# Patient Record
Sex: MALE | Race: WHITE | NOT HISPANIC OR LATINO | URBAN - METROPOLITAN AREA
[De-identification: names, ages, dates, MRNs, and addresses within clinical notes are randomized per-mention and may not be internally consistent; named-entity substitution may affect disease eponyms.]

---

## 2018-08-07 ENCOUNTER — CONVERSION ENCOUNTER (OUTPATIENT)
Dept: GASTROENTEROLOGY | Facility: CLINIC | Age: 58
End: 2018-08-07

## 2018-08-07 ENCOUNTER — OFFICE VISIT CONVERTED (OUTPATIENT)
Dept: GASTROENTEROLOGY | Facility: CLINIC | Age: 58
End: 2018-08-07
Attending: INTERNAL MEDICINE

## 2018-10-09 ENCOUNTER — CONVERSION ENCOUNTER (OUTPATIENT)
Dept: GASTROENTEROLOGY | Facility: CLINIC | Age: 58
End: 2018-10-09

## 2018-10-09 ENCOUNTER — OFFICE VISIT CONVERTED (OUTPATIENT)
Dept: GASTROENTEROLOGY | Facility: CLINIC | Age: 58
End: 2018-10-09
Attending: INTERNAL MEDICINE

## 2019-03-07 ENCOUNTER — OFFICE VISIT CONVERTED (OUTPATIENT)
Dept: FAMILY MEDICINE CLINIC | Facility: CLINIC | Age: 59
End: 2019-03-07
Attending: FAMILY MEDICINE

## 2020-07-13 ENCOUNTER — OFFICE VISIT CONVERTED (OUTPATIENT)
Dept: FAMILY MEDICINE CLINIC | Facility: CLINIC | Age: 60
End: 2020-07-13
Attending: NURSE PRACTITIONER

## 2020-07-13 ENCOUNTER — CONVERSION ENCOUNTER (OUTPATIENT)
Dept: FAMILY MEDICINE CLINIC | Facility: CLINIC | Age: 60
End: 2020-07-13

## 2020-07-28 ENCOUNTER — OFFICE VISIT CONVERTED (OUTPATIENT)
Dept: FAMILY MEDICINE CLINIC | Facility: CLINIC | Age: 60
End: 2020-07-28
Attending: NURSE PRACTITIONER

## 2021-05-13 NOTE — PROGRESS NOTES
Progress Note      Patient Name: Lui Sanchez   Patient ID: [id number]   Sex: Male   YOB: 1960    Primary Care Provider: Linda KAUR   Referring Provider: Linda KAUR    Visit Date: July 13, 2020    Provider: NATASHA Mace   Location: [facility]   Location Address: [address] [place] [address], [address]  [id number]   Location Phone: [phone]          Chief Complaint  · allergic reaction to hair dye      History Of Present Illness  Lui Sanchez is a 59 year old /Black male who presents for evaluation and treatment of:      For an acute visit today.  He is a patient of Dr. Green.  He is complaining of allergic reaction to the hair dye that he used on his hair yesterday.  He states that the hair dye dripped down to his chin and his ear and now he has got swelling and redness in this face and ears and is itching.  He denies any difficulty breathing.       Past Medical History  Disease Name Date Onset Notes   Allergic rhinitis, chronic --  --    Colon cancer screening --  3/24/17- Colonoscopy- Hyperplastic polyp- DR. Beltre   GERD --  --    High blood pressure --  --    Peripheral neuropathy --  --    Radiculopathy --  --          Past Surgical History  Procedure Name Date Notes   Appendectomy --  --    Hernia --  --    Hernia Repair --  --    Shoulder surgery --  --          Medication List  Name Date Started Instructions   amlodipine-benazepril 10-40 mg oral capsule  take 1 capsule by oral route once daily   Keflex 500 mg oral capsule 07/28/2020 take 1 capsule (500 mg) by oral route every 12 hours for 7 days   Protonix 40 mg oral tablet,delayed release (/EC) 01/08/2019 take 1 tablet (40 mg) by oral route once daily for 30 days         Allergy List  Allergen Name Date Reaction Notes   NO KNOWN DRUG ALLERGIES --  --  --    Dye allergic reaction --  --  Hair Dye         Family Medical History  Disease Name Relative/Age Notes  "  Cancer, Unspecified  --    Diabetes, unspecified type Brother/  Father/  Grandmother (maternal)/  Mother/   Mother; Father; Grandmother (maternal); Aunt (maternal); Aunt (paternal); Uncle (paternal); Brother   Family history of breast cancer Grandmother (paternal)/60s   Grandmother (paternal)/60s   -Father's Family History Unknown Father/   Father   -Mother's Family History Unknown Mother/   Mother         Social History  Finding Status Start/Stop Quantity Notes   Alcohol Current some day --/-- --  drinks weekly   Caffeine Current - status unknown --/-- --  drinks coffee; 3-4 times per day   Second hand smoke exposure Never --/-- --  no   Tobacco Former [age] --  former smoker; started smoking at age 21; quit smoking at age 41; smoked 20 cigarette(s) per day         Review of Systems  · Constitutional  o Denies  o : fever, fatigue, weight loss, weight gain  · HENT  o Denies  o : nasal congestion, dental problems, sore throat, ear pain  · Cardiovascular  o Denies  o : lower extremity edema, claudication, chest pressure, palpitations  · Respiratory  o Denies  o : shortness of breath, wheezing, cough, hemoptysis, dyspnea on exertion  · Gastrointestinal  o Denies  o : nausea, vomiting, diarrhea, constipation, abdominal pain  · Integument  o Admits  o : Swelling and redness of face and ears, itching      Vitals  Date Time BP Position Site L\R Cuff Size HR RR TEMP (F) WT  HT  BMI kg/m2 BSA m2 O2 Sat        07/13/2020 04:00 /82 Sitting    66 - R  98.2 233lbs 4oz 5'  10\" 33.47 2.29 98 %          Physical Examination  · Constitutional  o Appearance  o : no acute distress, well-nourished  · Head and Face  o Head  o :   § Inspection  § : atraumatic, normocephalic  · Respiratory  o Respiratory Effort  o : breathing comfortably, symmetric chest rise  o Auscultation of Lungs  o : clear to asculatation bilaterally, no wheezes, rales, or rhonchii  · Cardiovascular  o Heart  o :   § Auscultation of Heart  § : regular rate " and rhythm, no murmurs, rubs, or gallops  o Peripheral Vascular System  o :   § Extremities  § : no edema  · Lymphatic  o Neck  o : no lymphadenopathy present  · Skin and Subcutaneous Tissue  o General Inspection  o : Bilateral ears and cheeks of face are red and swollen  · Neurologic  o Mental Status Examination  o :   § Orientation  § : grossly oriented to person, place and time  o Gait and Station  o :   § Gait Screening  § : normal gait  · Psychiatric  o General  o : normal mood and affect          Assessment  · Allergic reaction to chemical substance       Toxic effect of unspecified substance, accidental (unintentional), initial encounter     989.9/T65.91XA    Problems Reconciled  Plan  · Orders  o IM/SQ - Injection Fee [facility] (61961) - - 07/13/2020  o ACO-39: Current medications updated and reviewed () - - 07/13/2020  o Solu-Medrol Injection 125mg () - 989.9/T65.91XA - 07/13/2020   Injection - Solu-Medrol 125 mg; Dose: 125 mg; Site: Right Gluteus; Route: intramuscular; Date: 07/13/2020 16:33:15; Exp: 02/01/2022; Lot: [id number]; Mfg: [employer]; TradeName: Solu-Medrol; Location: [facility]; Administered By: Elisabet Mcknight MA; Comment: Pt tolerated well, stable condition  · Medications  o prednisone 10 mg oral tablets,dose pack   SIG: take as directed for 6 days   DISP: (1) 21 ct dose-pack with 0 refills  Prescribed on 07/13/2020     o Benadryl 25 mg oral capsule   SIG: take 1 capsule (25 mg) by oral route every 4-6 hours as needed for 4 days   DISP: (20) capsules with 0 refills  Prescribed on 07/13/2020     o Medications have been Reconciled  o Transition of Care or Provider Policy  · Instructions  o Take all medications as prescribed/directed.  o Patient was educated/instructed on their diagnosis, treatment and medications prior to discharge from the clinic today.  o Patient instructed to seek medical attention urgently for new or worsening symptoms.  o Call the office with any concerns or  questions.  · Disposition  o Call or Return if symptoms worsen or persist.     Solu-Medrol 125 mg IM in office today.  Will start prednisone Dosepak in a.m.  Advised to start taking Benadryl tonight and every 4-6 hours as needed, advised will cause drowsiness and not to drive while taking.  Advised to always test hair dye on small area first.    Instructed that if his swelling or if he starts having any difficulty breathing to go to the ER immediately.             Electronically Signed by: NATASHA Mace -Author on July 28, 2020 03:34:59 PM

## 2021-05-13 NOTE — PROGRESS NOTES
Progress Note      Patient Name: Lui Sanchez   Patient ID: [id number]   Sex: Male   YOB: 1960    Primary Care Provider: Destin Deng MD   Referring Provider: Destin Deng MD    Visit Date: July 28, 2020    Provider: NATASHA Mace   Location: [facility]   Location Address: [address] [place] [address], [address]  [id number]   Location Phone: [phone]          Chief Complaint  · rash on back of head, ears, and scalp still burning, possibly still infected      History Of Present Illness  Lui Sanchez is a 59 year old /Black male who presents for evaluation and treatment of:      For an acute visit today and a follow-up on allergic reaction to hair dye.  He is a patient of Dr. Green.    He was treated on 7/13/2020 for an acute reaction to hair dye.  He has had swelling and redness around his face and ears.  He was treated with Solu-Medrol injection, prednisone Dosepak, and Benadryl.  He states the swelling improved the next day.  However, now his external ears are dry, cracked and erythematous.  He states his external ears are oozing fluid.   He has been applying Vaseline.       Past Medical History  Disease Name Date Onset Notes   Allergic rhinitis, chronic --  --    Colon cancer screening --  3/24/17- Colonoscopy- Hyperplastic polyp- DR. Beltre   GERD --  --    High blood pressure --  --    Peripheral neuropathy --  --    Radiculopathy --  --          Past Surgical History  Procedure Name Date Notes   Appendectomy --  --    Hernia --  --    Hernia Repair --  --    Shoulder surgery --  --          Medication List  Name Date Started Instructions   amlodipine-benazepril 10-40 mg oral capsule  take 1 capsule by oral route once daily   Protonix 40 mg oral tablet,delayed release (/EC) 01/08/2019 take 1 tablet (40 mg) by oral route once daily for 30 days         Allergy List  Allergen Name Date Reaction Notes   NO KNOWN DRUG ALLERGIES --  --  --    Dye allergic  "reaction --  --  Hair Dye         Family Medical History  Disease Name Relative/Age Notes   Cancer, Unspecified  --    Diabetes, unspecified type Brother/  Father/  Grandmother (maternal)/  Mother/   Mother; Father; Grandmother (maternal); Aunt (maternal); Aunt (paternal); Uncle (paternal); Brother   Family history of breast cancer Grandmother (paternal)/60s   Grandmother (paternal)/60s   -Father's Family History Unknown Father/   Father   -Mother's Family History Unknown Mother/   Mother         Social History  Finding Status Start/Stop Quantity Notes   Alcohol Current some day --/-- --  drinks weekly   Caffeine Current - status unknown --/-- --  drinks coffee; 3-4 times per day   Second hand smoke exposure Never --/-- --  no   Tobacco Former 20/40 --  former smoker; started smoking at age 21; quit smoking at age 41; smoked 20 cigarette(s) per day         Review of Systems  · Constitutional  o Denies  o : fever, fatigue, weight loss, weight gain  · HENT  o Denies  o : sore throat, ear pain, ear fullness  · Cardiovascular  o Denies  o : lower extremity edema, claudication, chest pressure, palpitations  · Respiratory  o Denies  o : shortness of breath, wheezing, cough, hemoptysis, dyspnea on exertion  · Gastrointestinal  o Denies  o : nausea, vomiting, diarrhea, constipation, abdominal pain  · Integument  o Admits  o : rash, skin dryness  o Denies  o : itching      Vitals  Date Time BP Position Site L\R Cuff Size HR RR TEMP (F) WT  HT  BMI kg/m2 BSA m2 O2 Sat        07/28/2020 03:24 /90 Sitting    73 - R  98.7 231lbs 2oz 5'  10\" 33.16 2.28 98 %          Physical Examination  · Constitutional  o Appearance  o : no acute distress, well-nourished  · Head and Face  o Head  o :   § Inspection  § : atraumatic, normocephalic  · Respiratory  o Respiratory Effort  o : breathing comfortably, symmetric chest rise  o Auscultation of Lungs  o : clear to asculatation bilaterally, no wheezes, rales, or " rhonchii  · Cardiovascular  o Heart  o :   § Auscultation of Heart  § : regular rate and rhythm, no murmurs, rubs, or gallops  · Skin and Subcutaneous Tissue  o General Inspection  o : Dry, cracked skin to external helix bilaterally, mild erythremia, no drainage  · Neurologic  o Mental Status Examination  o :   § Orientation  § : grossly oriented to person, place and time  o Gait and Station  o :   § Gait Screening  § : normal gait  · Psychiatric  o General  o : normal mood and affect          Assessment  · Cellulitis of both external ears     380.10/H60.13    Problems Reconciled  Plan  · Orders  o ACO-39: Current medications updated and reviewed () - - 07/28/2020  · Medications  o Keflex 500 mg oral capsule   SIG: take 1 capsule (500 mg) by oral route every 12 hours for 7 days   DISP: (14) capsules with 0 refills  Prescribed on 07/28/2020     o Medications have been Reconciled  o Transition of Care or Provider Policy  · Instructions  o Patient was educated/instructed on their diagnosis, treatment and medications prior to discharge from the clinic today.  o Patient instructed to seek medical attention urgently for new or worsening symptoms.  o Call the office with any concerns or questions.  · Disposition  o Call or Return if symptoms worsen or persist.            Electronically Signed by: NATASHA Mace -Author on July 28, 2020 03:40:09 PM

## 2021-05-15 VITALS
SYSTOLIC BLOOD PRESSURE: 130 MMHG | WEIGHT: 231.12 LBS | BODY MASS INDEX: 33.09 KG/M2 | HEART RATE: 73 BPM | HEIGHT: 70 IN | OXYGEN SATURATION: 98 % | DIASTOLIC BLOOD PRESSURE: 90 MMHG | TEMPERATURE: 98.7 F

## 2021-05-15 VITALS
HEIGHT: 70 IN | BODY MASS INDEX: 33.39 KG/M2 | DIASTOLIC BLOOD PRESSURE: 82 MMHG | WEIGHT: 233.25 LBS | TEMPERATURE: 98.2 F | SYSTOLIC BLOOD PRESSURE: 124 MMHG | OXYGEN SATURATION: 98 % | HEART RATE: 66 BPM

## 2021-05-16 VITALS
BODY MASS INDEX: 31.8 KG/M2 | DIASTOLIC BLOOD PRESSURE: 82 MMHG | OXYGEN SATURATION: 96 % | WEIGHT: 222.12 LBS | HEIGHT: 70 IN | HEART RATE: 64 BPM | SYSTOLIC BLOOD PRESSURE: 126 MMHG | TEMPERATURE: 98 F

## 2021-05-16 VITALS
SYSTOLIC BLOOD PRESSURE: 151 MMHG | HEIGHT: 70 IN | DIASTOLIC BLOOD PRESSURE: 102 MMHG | BODY MASS INDEX: 31.96 KG/M2 | WEIGHT: 223.25 LBS

## 2021-05-16 VITALS
BODY MASS INDEX: 32.12 KG/M2 | HEIGHT: 70 IN | DIASTOLIC BLOOD PRESSURE: 102 MMHG | WEIGHT: 224.37 LBS | HEART RATE: 66 BPM | SYSTOLIC BLOOD PRESSURE: 151 MMHG

## 2024-05-30 ENCOUNTER — PREP FOR SURGERY (OUTPATIENT)
Dept: OTHER | Facility: HOSPITAL | Age: 64
End: 2024-05-30
Payer: OTHER GOVERNMENT

## 2024-05-30 ENCOUNTER — OFFICE VISIT (OUTPATIENT)
Dept: SURGERY | Facility: CLINIC | Age: 64
End: 2024-05-30
Payer: OTHER GOVERNMENT

## 2024-05-30 VITALS
SYSTOLIC BLOOD PRESSURE: 149 MMHG | BODY MASS INDEX: 32.48 KG/M2 | HEART RATE: 64 BPM | HEIGHT: 71 IN | DIASTOLIC BLOOD PRESSURE: 100 MMHG | WEIGHT: 232 LBS

## 2024-05-30 DIAGNOSIS — R22.2 MASS OF SKIN OF BACK: Primary | ICD-10-CM

## 2024-05-30 RX ORDER — SODIUM CHLORIDE 0.9 % (FLUSH) 0.9 %
10 SYRINGE (ML) INJECTION EVERY 12 HOURS SCHEDULED
OUTPATIENT
Start: 2024-05-30

## 2024-05-30 RX ORDER — SODIUM CHLORIDE 0.9 % (FLUSH) 0.9 %
10 SYRINGE (ML) INJECTION AS NEEDED
OUTPATIENT
Start: 2024-05-30

## 2024-05-30 RX ORDER — SODIUM CHLORIDE 9 MG/ML
40 INJECTION, SOLUTION INTRAVENOUS AS NEEDED
OUTPATIENT
Start: 2024-05-30

## 2024-05-30 RX ORDER — BIOTIN 10 MG
TABLET ORAL
COMMUNITY

## 2024-05-30 NOTE — PROGRESS NOTES
Chief Complaint: Cyst (RIGHT SHOULDER. PATIENT STATES CYST HAS DRAINED.)    Subjective       History of Present Illness    Lui Sanchez is a 63 y.o. male presents to [facility]   History of Present Illness  The patient presents for evaluation of a cyst on his back.    Approximately 1.5 months ago, [facility]. The cyst was approximately 0.25 inch in diameter. Approximately 3 weeks ago, the cyst ruptured and began to drain through his shirt. His son attempted to extract some of the fluid, which was yellow and blood-tinged, resembling lotion or soap. The patient sought medical evaluation at the peak of the swelling, suspecting only a cyst. It is noteworthy that the patient underwent shoulder surgery a few years ago.  Objective     Past Medical History:   Diagnosis Date    GERD (gastroesophageal reflux disease)     Hypertension        Past Surgical History:   Procedure Laterality Date    APPENDECTOMY      FEMUR SURGERY      removed mass    HERNIA REPAIR      Umbilical and Hiatal    SHOULDER ARTHROSCOPY           Current Outpatient Medications:     amLODIPine-benazepril (LOTREL) 10-40 MG per capsule, amlodipine-benazepril 10-40 mg oral capsule take 1 capsule by oral route once daily   Active, Disp: , Rfl:     Ascorbic Acid (Vitamin C) 500 MG chewable tablet, Chew., Disp: , Rfl:     Biotin 10 MG tablet, Take  by mouth., Disp: , Rfl:     Cyanocobalamin (Vitamin B-12) 5000 MCG lozenge, Take  by mouth., Disp: , Rfl:     HYDROCHLOROTHIAZIDE PO, Take  by mouth., Disp: , Rfl:     Loratadine (Claritin) 10 MG capsule, Take  by mouth., Disp: , Rfl:     pantoprazole (PROTONIX) 20 MG EC tablet, Take 1 tablet by mouth Daily., Disp: , Rfl:     Allergies   Allergen Reactions    Other Swelling     PATIENT STATES HE IS NOT ALLERGIC TO ANYTHING.        Family History   Problem Relation Age of Onset    No Known Problems Mother     No Known Problems  "Father     Cancer Paternal Grandmother     Cancer Paternal Grandfather        Social History     Socioeconomic History    Marital status:    Tobacco Use    Smoking status: Former     Current packs/day: 0.50     Types: Cigarettes     Passive exposure: Never    Smokeless tobacco: Never   Vaping Use    Vaping status: Never Used   Substance and Sexual Activity    Alcohol use: Yes     Alcohol/week: 1.0 standard drink of alcohol     Types: 1 Cans of beer per week     Comment: Occasionally    Drug use: Never    Sexual activity: Defer       Vital Signs:   /100 (BP Location: Right arm, Patient Position: Sitting, Cuff Size: Adult)   Pulse 64   Ht 180.3 cm (71\")   Wt 105 kg (232 lb)   BMI 32.36 kg/m²      Physical Exam   Physical Exam  Patient is in no acute distress, nonlabored.  Abdomen is soft.  On his back, on the upper right shoulder, there are 2 lesions, one superior to one, a little inferior and further to the right lateral, which both appear to be sebaceous cysts. They are firm. They have a punctum. They do not appear to be infected at this time. He also has multiple skin lesions which appear to be either moles or actinic keratoses.      Result Review :         Procedures   Results             Assessment and Plan   Diagnoses and all orders for this visit:    1. Mass of skin of back (Primary)          Assessment & Plan  1. Multiple skin lesions on his back.  The patient expressed a desire for excision of these lesions in the operating room. The potential risks, benefits, and alternatives of the procedure were thoroughly discussed. All his queries were addressed. He expressed understanding and agreed to proceed with the proposed plan.       Follow Up   No follow-ups on file.  Patient was given instructions and counseling regarding his condition or for health maintenance advice. Please see specific information pulled into the AVS if appropriate.     Discussed with the patient - all questions were answered " they voiced understanding and agreed to proceed with above plan    Patient or patient representative verbalized consent for the use of Ambient Listening during the visit with  Gallo Morales MD for chart documentation. 5/30/2024  11:47 EDT    Gallo Morales MD

## 2024-06-02 ENCOUNTER — HOSPITAL ENCOUNTER (EMERGENCY)
Facility: HOSPITAL | Age: 64
Discharge: HOME | End: 2024-06-02
Attending: EMERGENCY MEDICINE | Admitting: EMERGENCY MEDICINE
Payer: OTHER GOVERNMENT

## 2024-06-02 VITALS
SYSTOLIC BLOOD PRESSURE: 150 MMHG | HEART RATE: 97 BPM | RESPIRATION RATE: 22 BRPM | DIASTOLIC BLOOD PRESSURE: 88 MMHG | HEIGHT: 70 IN | TEMPERATURE: 102.7 F | WEIGHT: 225.53 LBS | OXYGEN SATURATION: 93 % | BODY MASS INDEX: 32.29 KG/M2

## 2024-06-02 DIAGNOSIS — J18.9 PNEUMONIA OF RIGHT UPPER LOBE DUE TO INFECTIOUS ORGANISM: Primary | ICD-10-CM

## 2024-06-02 LAB
ALBUMIN SERPL-MCNC: 3.9 G/DL (ref 3.5–5.2)
ALBUMIN/GLOB SERPL: 1.1 G/DL
ALP SERPL-CCNC: 81 U/L (ref 39–117)
ALT SERPL W P-5'-P-CCNC: 38 U/L (ref 1–41)
ANION GAP SERPL CALCULATED.3IONS-SCNC: 14.6 MMOL/L (ref 5–15)
AST SERPL-CCNC: 32 U/L (ref 1–40)
BASOPHILS # BLD AUTO: 0.04 10*3/MM3 (ref 0–0.2)
BASOPHILS NFR BLD AUTO: 0.5 % (ref 0–1.5)
BILIRUB SERPL-MCNC: 2.9 MG/DL (ref 0–1.2)
BUN SERPL-MCNC: 13 MG/DL (ref 8–23)
BUN/CREAT SERPL: 10.2 (ref 7–25)
CALCIUM SPEC-SCNC: 9.5 MG/DL (ref 8.6–10.5)
CHLORIDE SERPL-SCNC: 95 MMOL/L (ref 98–107)
CO2 SERPL-SCNC: 24.4 MMOL/L (ref 22–29)
CREAT SERPL-MCNC: 1.27 MG/DL (ref 0.76–1.27)
D-LACTATE SERPL-SCNC: 1.6 MMOL/L (ref 0.5–2)
DEPRECATED RDW RBC AUTO: 43.8 FL (ref 37–54)
EGFRCR SERPLBLD CKD-EPI 2021: 63.9 ML/MIN/1.73
EOSINOPHIL # BLD AUTO: 0 10*3/MM3 (ref 0–0.4)
EOSINOPHIL NFR BLD AUTO: 0 % (ref 0.3–6.2)
ERYTHROCYTE [DISTWIDTH] IN BLOOD BY AUTOMATED COUNT: 12.5 % (ref 12.3–15.4)
GLOBULIN UR ELPH-MCNC: 3.7 GM/DL
GLUCOSE SERPL-MCNC: 113 MG/DL (ref 65–99)
HCT VFR BLD AUTO: 42.7 % (ref 37.5–51)
HGB BLD-MCNC: 13.9 G/DL (ref 13–17.7)
IMM GRANULOCYTES # BLD AUTO: 0.08 10*3/MM3 (ref 0–0.05)
IMM GRANULOCYTES NFR BLD AUTO: 1.1 % (ref 0–0.5)
LYMPHOCYTES # BLD AUTO: 0.75 10*3/MM3 (ref 0.7–3.1)
LYMPHOCYTES NFR BLD AUTO: 10.2 % (ref 19.6–45.3)
MCH RBC QN AUTO: 30.8 PG (ref 26.6–33)
MCHC RBC AUTO-ENTMCNC: 32.6 G/DL (ref 31.5–35.7)
MCV RBC AUTO: 94.7 FL (ref 79–97)
MONOCYTES # BLD AUTO: 0.72 10*3/MM3 (ref 0.1–0.9)
MONOCYTES NFR BLD AUTO: 9.8 % (ref 5–12)
NEUTROPHILS NFR BLD AUTO: 5.76 10*3/MM3 (ref 1.7–7)
NEUTROPHILS NFR BLD AUTO: 78.4 % (ref 42.7–76)
NRBC BLD AUTO-RTO: 0 /100 WBC (ref 0–0.2)
PLATELET # BLD AUTO: 181 10*3/MM3 (ref 140–450)
PMV BLD AUTO: 10.5 FL (ref 6–12)
POTASSIUM SERPL-SCNC: 4 MMOL/L (ref 3.5–5.2)
PROT SERPL-MCNC: 7.6 G/DL (ref 6–8.5)
RBC # BLD AUTO: 4.51 10*6/MM3 (ref 4.14–5.8)
SODIUM SERPL-SCNC: 134 MMOL/L (ref 136–145)
WBC NRBC COR # BLD AUTO: 7.35 10*3/MM3 (ref 3.4–10.8)

## 2024-06-02 PROCEDURE — 25010000002 AZITHROMYCIN PER 500 MG: Performed by: EMERGENCY MEDICINE

## 2024-06-02 PROCEDURE — 99283 EMERGENCY DEPT VISIT LOW MDM: CPT

## 2024-06-02 PROCEDURE — 96367 TX/PROPH/DG ADDL SEQ IV INF: CPT

## 2024-06-02 PROCEDURE — 96365 THER/PROPH/DIAG IV INF INIT: CPT

## 2024-06-02 PROCEDURE — 80053 COMPREHEN METABOLIC PANEL: CPT | Performed by: EMERGENCY MEDICINE

## 2024-06-02 PROCEDURE — 85025 COMPLETE CBC W/AUTO DIFF WBC: CPT | Performed by: EMERGENCY MEDICINE

## 2024-06-02 PROCEDURE — 87040 BLOOD CULTURE FOR BACTERIA: CPT | Performed by: EMERGENCY MEDICINE

## 2024-06-02 PROCEDURE — 36415 COLL VENOUS BLD VENIPUNCTURE: CPT | Performed by: EMERGENCY MEDICINE

## 2024-06-02 PROCEDURE — 25010000002 CEFTRIAXONE PER 250 MG: Performed by: EMERGENCY MEDICINE

## 2024-06-02 PROCEDURE — 83605 ASSAY OF LACTIC ACID: CPT | Performed by: EMERGENCY MEDICINE

## 2024-06-02 PROCEDURE — 25810000003 SODIUM CHLORIDE 0.9 % SOLUTION: Performed by: EMERGENCY MEDICINE

## 2024-06-02 RX ORDER — CEFPODOXIME PROXETIL 200 MG/1
200 TABLET, FILM COATED ORAL EVERY 12 HOURS
Qty: 20 TABLET | Refills: 0 | Status: SHIPPED | OUTPATIENT
Start: 2024-06-02

## 2024-06-02 RX ORDER — IBUPROFEN 600 MG/1
600 TABLET ORAL ONCE
Status: COMPLETED | OUTPATIENT
Start: 2024-06-02 | End: 2024-06-02

## 2024-06-02 RX ORDER — SODIUM CHLORIDE 0.9 % (FLUSH) 0.9 %
10 SYRINGE (ML) INJECTION AS NEEDED
Status: DISCONTINUED | OUTPATIENT
Start: 2024-06-02 | End: 2024-06-02 | Stop reason: HOSPADM

## 2024-06-02 RX ORDER — AZITHROMYCIN 250 MG/1
TABLET, FILM COATED ORAL
Qty: 6 TABLET | Refills: 0 | Status: SHIPPED | OUTPATIENT
Start: 2024-06-02

## 2024-06-02 RX ADMIN — CEFTRIAXONE SODIUM 2000 MG: 2 INJECTION, POWDER, FOR SOLUTION INTRAMUSCULAR; INTRAVENOUS at 15:29

## 2024-06-02 RX ADMIN — IBUPROFEN 600 MG: 600 TABLET, FILM COATED ORAL at 17:54

## 2024-06-02 RX ADMIN — AZITHROMYCIN MONOHYDRATE 500 MG: 500 INJECTION, POWDER, LYOPHILIZED, FOR SOLUTION INTRAVENOUS at 16:34

## 2024-06-02 RX ADMIN — SODIUM CHLORIDE 1000 ML: 9 INJECTION, SOLUTION INTRAVENOUS at 14:50

## 2024-06-02 NOTE — DISCHARGE INSTRUCTIONS
Rest at home.  Take Tylenol and/or ibuprofen for fever or muscle aches.  Drink plenty fluids.  Ensure adequate hydration.  Take the antibiotics as prescribed.  Follow-up with her primary care provider in 7 to 10 days if symptoms or not improving.  Return to the ER for worsening difficulty breathing, persistent fever greater than 101, vomiting, or any other concerns issues that may arise.

## 2024-06-02 NOTE — ED PROVIDER NOTES
"Time: 4:29 PM EDT  Date of encounter:  6/2/2024  Independent Historian/Clinical History and Information was obtained by:   Patient and Family  Chief Complaint: Fever and bodyaches    History is limited by: N/A    History of Present Illness:  Patient is a 63 y.o. year old male who presents to the emergency department for evaluation of fever and bodyaches.  Patient reports his symptoms began on Tuesday.  Patient reports has had subjective fevers along with chills.  He has diffuse bodyaches.  Subsequently patient went to an urgent care today.  There he was told he had pneumonia but the provider told the family that he may also have a early \"blood poisoning\" as well as urosepsis and that he needed to go to the ER for further testing.    HPI    Patient Care Team  Primary Care Provider: Aylin Peng APRN    Past Medical History:     No Known Allergies  Past Medical History:   Diagnosis Date    GERD (gastroesophageal reflux disease)     Hypertension      Past Surgical History:   Procedure Laterality Date    APPENDECTOMY      FEMUR SURGERY      removed mass    HERNIA REPAIR      Umbilical and Hiatal    SHOULDER ARTHROSCOPY       Family History   Problem Relation Age of Onset    No Known Problems Mother     No Known Problems Father     Cancer Paternal Grandmother     Cancer Paternal Grandfather        Home Medications:  Prior to Admission medications    Medication Sig Start Date End Date Taking? Authorizing Provider   amLODIPine-benazepril (LOTREL) 10-40 MG per capsule amlodipine-benazepril 10-40 mg oral capsule take 1 capsule by oral route once daily   Active    ProviderEmili MD   Ascorbic Acid (Vitamin C) 500 MG chewable tablet Chew.    Emili Cali MD   Biotin 10 MG tablet Take  by mouth.    Emili Cali MD   Cyanocobalamin (Vitamin B-12) 5000 MCG lozenge Take  by mouth.    Emili Cali MD   HYDROCHLOROTHIAZIDE PO Take  by mouth.    Emili Cali MD   Loratadine " "(Claritin) 10 MG capsule Take  by mouth.    Provider, MD Emili   pantoprazole (PROTONIX) 20 MG EC tablet Take 1 tablet by mouth Daily.    Provider, Emili, MD        Social History:   Social History     Tobacco Use    Smoking status: Former     Current packs/day: 0.50     Types: Cigarettes     Passive exposure: Never    Smokeless tobacco: Never   Vaping Use    Vaping status: Never Used   Substance Use Topics    Alcohol use: Yes     Alcohol/week: 1.0 standard drink of alcohol     Types: 1 Cans of beer per week     Comment: Occasionally    Drug use: Never         Review of Systems:  Review of Systems   Constitutional:  Positive for chills and fatigue. Negative for fever.   HENT:  Negative for congestion, ear pain and sore throat.    Eyes:  Negative for pain.   Respiratory:  Negative for cough, chest tightness and shortness of breath.    Cardiovascular:  Negative for chest pain.   Gastrointestinal:  Negative for abdominal pain, diarrhea, nausea and vomiting.   Genitourinary:  Negative for flank pain and hematuria.   Musculoskeletal:  Positive for myalgias. Negative for joint swelling.   Skin:  Negative for pallor.   Neurological:  Negative for seizures and headaches.   All other systems reviewed and are negative.       Physical Exam:  /92   Pulse 91   Temp 98.6 °F (37 °C) (Oral)   Resp 20   Ht 177.8 cm (70\")   Wt 102 kg (225 lb 8.5 oz)   SpO2 96%   BMI 32.36 kg/m²     Physical Exam    Vital signs were reviewed under triage note.  General appearance - Patient appears well-developed and well-nourished.  Patient is in no acute distress.  Patient is ill-appearing but not toxic appearing.  Head - Normocephalic, atraumatic.  Pupils - Equal, round, reactive to light.  Extraocular muscles are intact.  Conjunctiva is clear.  Nasal - Normal inspection.  No evidence of trauma or epistaxis.  Tympanic membranes - Gray, intact without erythema or retractions.  Oral mucosa - Pink and moist without lesions or " erythema.  Uvula is midline.  Chest wall - Atraumatic.  Chest wall is nontender.  There are no vesicular rashes noted.  Neck - Supple.  Trachea was midline.  There is no palpable lymphadenopathy or thyromegaly.  There are no meningeal signs  Lungs - Auscultation reveals some rhonchi on the right lung.  Left lung was clear.  Heart - Regular rate and rhythm without any murmurs, clicks, or gallops.  Abdomen - Soft.  Bowel sounds are present.  There is no palpable tenderness.  There is no rebound, guarding, or rigidity.  There are no palpable masses.  There are no pulsatile masses.  Back - Spine is straight and midline.  There is no CVA tenderness.  Extremities - Intact x4 with full range of motion.  There is no palpable edema.  Pulses are intact x4 and equal.  Neurologic - Patient is awake, alert, and oriented x3.  Cranial nerves II through XII are grossly intact.  Motor and sensory functions grossly intact.  Cerebellar function was normal.  Integument - There are no rashes.  There are no petechia or purpura lesions noted.  There are no vesicular lesions noted.           Procedures:  Procedures      Medical Decision Making:      Comorbidities that affect care:    Hypertension GERD    External Notes reviewed:    Previous Clinic Note: Urgent care visit from earlier today was reviewed by me. and Previous Radiological Studies: I also reviewed the chest x-ray that was performed at [facility] urgent care 12:05 PM today .  This was interpreted by Dr. Benoit and I personally reviewed the films to show a moderate consolidation in the right upper lobe consistent with pneumonia.      The following orders were placed and all results were independently analyzed by me:  Orders Placed This Encounter   Procedures    Blood Culture - Blood,    Blood Culture - Blood,    Comprehensive Metabolic Panel    Lactic Acid, Plasma    CBC Auto Differential    Insert Peripheral IV    CBC & Differential       Medications Given in the  Emergency Department:  Medications   AZITHROMYCIN 500 MG/250 ML 0.9% NS IVPB (vial-mate) (has no administration in time range)   sodium chloride 0.9 % flush 10 mL (has no administration in time range)   cefTRIAXone (ROCEPHIN) 2,000 mg in sodium chloride 0.9 % 100 mL IVPB-VTB (0 mg Intravenous Stopped 6/2/24 1625)   sodium chloride 0.9 % bolus 1,000 mL (0 mL Intravenous Stopped 6/2/24 1625)        ED Course:     The patient was seen and evaluated in the ED by me.  The above history and physical examination was performed as documented.  Diagnostic data was obtained.  Results reviewed.  Patient is a normal white count.  Normal lactic acid.  Normal vital signs.  There is no evidence of sepsis.  Patient will be started on antibiotics and can be treated as an outpatient.  I did discuss treatment plan with the patient and family.  Patient is stable for discharge home.  Patient was invited to return to the ER for any change or worsening of his overall condition.    Labs:    Lab Results (last 24 hours)       Procedure Component Value Units Date/Time    POC Urinalysis Dipstick, Multipro (Automated Dipstick) [[id number]]  (Abnormal) Resulted: 06/02/24 1205    Specimen: Urine Updated: 06/02/24 1207     Color Red     Clarity, UA Slightly Cloudy     Glucose, UA Negative mg/dL      Bilirubin Moderate (2+)     Ketones, UA 15 mg/dL     Specific Gravity  1.025     Blood, UA 1+     pH, Urine 6.0     Protein,  mg/dL mg/dL      Urobilinogen, UA 4 E.U./dL     Nitrite, UA Negative     Leukocytes Small (1+)    POCT SARS-CoV-2 Antigen STONE   ([facility]) [[id number]]  (Normal) Collected: 06/02/24 1211    Specimen: Swab Updated: 06/02/24 1211     SARS Antigen Not Detected     Internal Control Passed     Lot Number [id number]     Expiration Date 20,525    POC Influenza A/B [[id number]]  (Normal) Collected: 06/02/24 1211    Specimen: Swab Updated: 06/02/24 1211     Rapid Influenza A Ag Negative     Rapid Influenza B Ag Negative     Internal  Control Passed     Lot Number [id number]     Expiration Date 20,525    Comprehensive Metabolic Panel [[id number]]  (Abnormal) Collected: 06/02/24 1449    Specimen: Blood Updated: 06/02/24 1519     Glucose 113 mg/dL      BUN 13 mg/dL      Creatinine 1.27 mg/dL      Sodium 134 mmol/L      Potassium 4.0 mmol/L      Chloride 95 mmol/L      CO2 24.4 mmol/L      Calcium 9.5 mg/dL      Total Protein 7.6 g/dL      Albumin 3.9 g/dL      ALT (SGPT) 38 U/L      AST (SGOT) 32 U/L      Alkaline Phosphatase 81 U/L      Total Bilirubin 2.9 mg/dL      Globulin 3.7 gm/dL      A/G Ratio 1.1 g/dL      BUN/Creatinine Ratio 10.2     Anion Gap 14.6 mmol/L      eGFR 63.9 mL/min/1.73     Narrative:      GFR Normal >60  Chronic Kidney Disease <60  Kidney Failure <15      CBC & Differential [[id number]]  (Abnormal) Collected: 06/02/24 1449    Specimen: Blood Updated: 06/02/24 1501    Narrative:      The following orders were created for panel order CBC & Differential.  Procedure                               Abnormality         Status                     ---------                               -----------         ------                     CBC Auto Differential[[id number]]        Abnormal            Final result                 Please view results for these tests on the individual orders.    Lactic Acid, Plasma [[id number]]  (Normal) Collected: 06/02/24 1449    Specimen: Blood Updated: 06/02/24 1515     Lactate 1.6 mmol/L     Blood Culture - Blood, Arm, Left [[id number]] Collected: 06/02/24 1449    Specimen: Blood from Arm, Left Updated: 06/02/24 1457    CBC Auto Differential [[id number]]  (Abnormal) Collected: 06/02/24 1449    Specimen: Blood Updated: 06/02/24 1501     WBC 7.35 10*3/mm3      RBC 4.51 10*6/mm3      Hemoglobin 13.9 g/dL      Hematocrit 42.7 %      MCV 94.7 fL      MCH 30.8 pg      MCHC 32.6 g/dL      RDW 12.5 %      RDW-SD 43.8 fl      MPV 10.5 fL      Platelets 181 10*3/mm3      Neutrophil % 78.4 %      Lymphocyte % 10.2 %       Monocyte % 9.8 %      Eosinophil % 0.0 %      Basophil % 0.5 %      Immature Grans % 1.1 %      Neutrophils, Absolute 5.76 10*3/mm3      Lymphocytes, Absolute 0.75 10*3/mm3      Monocytes, Absolute 0.72 10*3/mm3      Eosinophils, Absolute 0.00 10*3/mm3      Basophils, Absolute 0.04 10*3/mm3      Immature Grans, Absolute 0.08 10*3/mm3      nRBC 0.0 /100 WBC     Blood Culture - Blood, Arm, Right [[id number]] Collected: 06/02/24 1457    Specimen: Blood from Arm, Right Updated: 06/02/24 1500             Imaging:    XR Chest 2 View    Result Date: 6/2/2024  XR CHEST 2 VW-  Date of Exam: 6/2/2024 11:54 AM  Indication: Oxygen level 95% slight decrease in left side lungs  Comparison: None available.  Findings: There is moderate consolidation in the right upper lobe. Left lung is clear. No effusion is seen. The cardiac mediastinal silhouettes appear normal.      Impression: 1.  Moderate consolidation in the right upper lobe consistent with pneumonia. A follow-up chest PA and lateral in 6 weeks is recommended to confirm resolution.   Electronically Signed By-Adan Curtis MD On:6/2/2024 1:59 PM         Differential Diagnosis and Discussion:    Fever: Based on the complaint of fever, differential diagnosis includes but is not limited to meningitis, pneumonia, pyelonephritis, acute uti,  systemic immune response syndrome, sepsis, viral syndrome, fungal infection, tick born illness and other bacterial infections.    All labs were reviewed and interpreted by me.    MDM     Amount and/or Complexity of Data Reviewed  Clinical lab tests: reviewed             Patient Care Considerations:          Consultants/Shared Management Plan:    None    Social Determinants of Health:    Patient has presented with family members who are responsible, reliable and will ensure follow up care.      Disposition and Care Coordination:    Discharged: I considered escalation of care by admitting this patient to the hospital, however patient did not  display any physical or laboratory findings concerning for sepsis and thus we discharged home with a trial of outpatient therapy.    I have explained the patient´s condition, diagnoses and treatment plan based on the information available to me at this time. I have answered questions and addressed any concerns. The patient has a good  understanding of the patient´s diagnosis, condition, and treatment plan as can be expected at this point. The vital signs have been stable. The patient´s condition is stable and appropriate for discharge from the emergency department.      The patient will pursue further outpatient evaluation with the primary care physician or other designated or consulting physician as outlined in the discharge instructions. They are agreeable to this plan of care and follow-up instructions have been explained in detail. The patient has received these instructions in written format and has expressed an understanding of the discharge instructions. The patient is aware that any significant change in condition or worsening of symptoms should prompt an immediate return to this or the closest emergency department or call to 911.  I have explained discharge medications and the need for follow up with the patient/caretakers. This was also printed in the discharge instructions. Patient was discharged with the following medications and follow up:      Medication List        New Prescriptions      azithromycin 250 MG tablet  Commonly known as: Zithromax Z-Gustavo  Take 2 tablets by mouth on day 1, then 1 tablet daily on days 2-5     cefpodoxime 200 MG tablet  Commonly known as: VANTIN  Take 1 tablet by mouth Every 12 (Twelve) Hours.               Where to Get Your Medications        [id number] - [place], [place] - [address] AT [facility] - [phone] [facility] [phone] FX  [address] [place] [address]      Phone: [phone]    azithromycin 250 MG tablet  cefpodoxime 200 MG tablet      Aylin Peng, APRN  [address]  [place] [address] [phone]    In 1 week  If symptoms fail to resolve or improve      Final diagnoses:   Pneumonia of right upper lobe due to infectious organism        ED Disposition       ED Disposition   Discharge    Condition   Stable    Comment   --               This medical record created using voice recognition software.             Jose Carlos Baldwin DO  06/05/24 2315

## 2024-06-07 LAB
BACTERIA SPEC AEROBE CULT: NORMAL
BACTERIA SPEC AEROBE CULT: NORMAL

## 2024-06-20 RX ORDER — ATORVASTATIN CALCIUM 10 MG/1
10 TABLET, FILM COATED ORAL DAILY
COMMUNITY

## 2024-06-20 RX ORDER — PANTOPRAZOLE SODIUM 40 MG/1
40 TABLET, DELAYED RELEASE ORAL DAILY
COMMUNITY

## 2024-06-20 NOTE — PRE-PROCEDURE INSTRUCTIONS
IMPORTANT INSTRUCTIONS - PRE-ADMISSION TESTING  DO NOT EAT OR CHEW anything after midnight the night before your procedure.    You may have SIPS NO RED LESS THEN 8 OZ CLEAR liquids up to __2____ hours prior to ARRIVAL time.  Take the following medications the morning of your procedure with JUST A SIP OF WATER:  _____ATORVASTATIN, LORATADINE IF NEEDED, __________________________________________________________________________________________________________________________________________________________________________________    DO NOT BRING your medications to the hospital with you, UNLESS something has changed since your PRE-Admission Testing appointment.  Hold all vitamins, supplements, and NSAIDS (Non- steroidal anti-inflammatory meds) for one week prior to surgery (you MAY take Tylenol or Acetaminophen).  If you are diabetic, check your blood sugar the morning of your procedure. If it is less than 70 or if you are feeling symptomatic, call the following number for further instructions: [phone]_.  Use your inhalers/nebulizers as usual, the morning of your procedure. BRING YOUR INHALERS with you.   Bring your CPAP or BIPAP to hospital, ONLY IF YOU WILL BE SPENDING THE NIGHT.   Make sure you have a ride home and have someone who will stay with you the day of your procedure after you go home.  If you have any questions, please call your Pre-Admission Testing Nurse, ___IVONE_____________ at [phone]____________.   Per anesthesia request, do not smoke for 24 hours before your procedure or as instructed by your surgeon.     IF ON A MONDAY THEY WILL CALL YOU THE FRIDAY BEFORE PROCEDURE  BATHING INSTRUCTIONS GIVEN. NO JEWELRY OF ANY TYPE  COME TO ENTRANCE A, ELEVATOR A, 3RD FLOOR DAY OF PROCEDURE.

## 2024-06-23 ENCOUNTER — ANESTHESIA EVENT (OUTPATIENT)
Dept: PERIOP | Facility: HOSPITAL | Age: 64
End: 2024-06-23
Payer: OTHER GOVERNMENT

## 2024-06-24 ENCOUNTER — ANESTHESIA (OUTPATIENT)
Dept: PERIOP | Facility: HOSPITAL | Age: 64
End: 2024-06-24
Payer: OTHER GOVERNMENT

## 2024-06-24 ENCOUNTER — HOSPITAL ENCOUNTER (OUTPATIENT)
Facility: HOSPITAL | Age: 64
Setting detail: HOSPITAL OUTPATIENT SURGERY
Discharge: HOME | End: 2024-06-24
Attending: SURGERY | Admitting: SURGERY
Payer: OTHER GOVERNMENT

## 2024-06-24 VITALS
DIASTOLIC BLOOD PRESSURE: 73 MMHG | BODY MASS INDEX: 31.39 KG/M2 | SYSTOLIC BLOOD PRESSURE: 110 MMHG | TEMPERATURE: 97 F | HEIGHT: 71 IN | WEIGHT: 224.21 LBS | HEART RATE: 53 BPM | RESPIRATION RATE: 16 BRPM | OXYGEN SATURATION: 97 %

## 2024-06-24 DIAGNOSIS — R22.2 MASS OF SKIN OF BACK: ICD-10-CM

## 2024-06-24 PROCEDURE — 25010000002 FENTANYL CITRATE (PF) 50 MCG/ML SOLUTION

## 2024-06-24 PROCEDURE — 25010000002 DEXAMETHASONE PER 1 MG

## 2024-06-24 PROCEDURE — 25010000002 BUPIVACAINE (PF) 0.5 % SOLUTION 10 ML VIAL: Performed by: SURGERY

## 2024-06-24 PROCEDURE — 25010000002 KETOROLAC TROMETHAMINE PER 15 MG

## 2024-06-24 PROCEDURE — 25010000002 SUGAMMADEX 200 MG/2ML SOLUTION

## 2024-06-24 PROCEDURE — 11400 EXC TR-EXT B9+MARG 0.5 CM<: CPT | Performed by: SURGERY

## 2024-06-24 PROCEDURE — 11300 SHAVE SKIN LESION 0.5 CM/<: CPT | Performed by: SURGERY

## 2024-06-24 PROCEDURE — 88304 TISSUE EXAM BY PATHOLOGIST: CPT | Performed by: SURGERY

## 2024-06-24 PROCEDURE — 25010000002 MIDAZOLAM PER 1MG: Performed by: ANESTHESIOLOGY

## 2024-06-24 PROCEDURE — 11402 EXC TR-EXT B9+MARG 1.1-2 CM: CPT | Performed by: SURGERY

## 2024-06-24 PROCEDURE — 25010000002 HYDROMORPHONE 1 MG/ML SOLUTION

## 2024-06-24 PROCEDURE — 11401 EXC TR-EXT B9+MARG 0.6-1 CM: CPT | Performed by: SURGERY

## 2024-06-24 PROCEDURE — 88305 TISSUE EXAM BY PATHOLOGIST: CPT | Performed by: SURGERY

## 2024-06-24 PROCEDURE — 25810000003 LACTATED RINGERS PER 1000 ML: Performed by: ANESTHESIOLOGY

## 2024-06-24 PROCEDURE — 25010000002 ONDANSETRON PER 1 MG

## 2024-06-24 PROCEDURE — 25010000002 CEFAZOLIN PER 500 MG: Performed by: SURGERY

## 2024-06-24 PROCEDURE — 25010000002 PROPOFOL 200 MG/20ML EMULSION

## 2024-06-24 RX ORDER — HYDROCODONE BITARTRATE AND ACETAMINOPHEN 5; 325 MG/1; MG/1
1 TABLET ORAL ONCE AS NEEDED
Status: DISCONTINUED | OUTPATIENT
Start: 2024-06-24 | End: 2024-06-24 | Stop reason: HOSPADM

## 2024-06-24 RX ORDER — PROPOFOL 10 MG/ML
INJECTION, EMULSION INTRAVENOUS AS NEEDED
Status: DISCONTINUED | OUTPATIENT
Start: 2024-06-24 | End: 2024-06-24 | Stop reason: SURG

## 2024-06-24 RX ORDER — SODIUM CHLORIDE 0.9 % (FLUSH) 0.9 %
10 SYRINGE (ML) INJECTION AS NEEDED
Status: DISCONTINUED | OUTPATIENT
Start: 2024-06-24 | End: 2024-06-24 | Stop reason: HOSPADM

## 2024-06-24 RX ORDER — SODIUM CHLORIDE, SODIUM LACTATE, POTASSIUM CHLORIDE, CALCIUM CHLORIDE 600; 310; 30; 20 MG/100ML; MG/100ML; MG/100ML; MG/100ML
9 INJECTION, SOLUTION INTRAVENOUS CONTINUOUS PRN
Status: DISCONTINUED | OUTPATIENT
Start: 2024-06-24 | End: 2024-06-24 | Stop reason: HOSPADM

## 2024-06-24 RX ORDER — ROCURONIUM BROMIDE 10 MG/ML
INJECTION, SOLUTION INTRAVENOUS AS NEEDED
Status: DISCONTINUED | OUTPATIENT
Start: 2024-06-24 | End: 2024-06-24 | Stop reason: SURG

## 2024-06-24 RX ORDER — ONDANSETRON 2 MG/ML
4 INJECTION INTRAMUSCULAR; INTRAVENOUS ONCE AS NEEDED
Status: DISCONTINUED | OUTPATIENT
Start: 2024-06-24 | End: 2024-06-24 | Stop reason: HOSPADM

## 2024-06-24 RX ORDER — SODIUM CHLORIDE 9 MG/ML
40 INJECTION, SOLUTION INTRAVENOUS AS NEEDED
Status: DISCONTINUED | OUTPATIENT
Start: 2024-06-24 | End: 2024-06-24 | Stop reason: HOSPADM

## 2024-06-24 RX ORDER — PROMETHAZINE HYDROCHLORIDE 12.5 MG/1
25 TABLET ORAL ONCE AS NEEDED
Status: DISCONTINUED | OUTPATIENT
Start: 2024-06-24 | End: 2024-06-24 | Stop reason: HOSPADM

## 2024-06-24 RX ORDER — DEXAMETHASONE SODIUM PHOSPHATE 4 MG/ML
INJECTION, SOLUTION INTRA-ARTICULAR; INTRALESIONAL; INTRAMUSCULAR; INTRAVENOUS; SOFT TISSUE AS NEEDED
Status: DISCONTINUED | OUTPATIENT
Start: 2024-06-24 | End: 2024-06-24 | Stop reason: SURG

## 2024-06-24 RX ORDER — DOCUSATE SODIUM 100 MG/1
100 CAPSULE, LIQUID FILLED ORAL 2 TIMES DAILY PRN
Qty: 10 CAPSULE | Refills: 1 | Status: SHIPPED | OUTPATIENT
Start: 2024-06-24 | End: 2025-06-24

## 2024-06-24 RX ORDER — ACETAMINOPHEN 500 MG
1000 TABLET ORAL ONCE
Status: COMPLETED | OUTPATIENT
Start: 2024-06-24 | End: 2024-06-24

## 2024-06-24 RX ORDER — DEXMEDETOMIDINE HYDROCHLORIDE 100 UG/ML
INJECTION, SOLUTION INTRAVENOUS AS NEEDED
Status: DISCONTINUED | OUTPATIENT
Start: 2024-06-24 | End: 2024-06-24 | Stop reason: SURG

## 2024-06-24 RX ORDER — PROMETHAZINE HYDROCHLORIDE 25 MG/1
25 SUPPOSITORY RECTAL ONCE AS NEEDED
Status: DISCONTINUED | OUTPATIENT
Start: 2024-06-24 | End: 2024-06-24 | Stop reason: HOSPADM

## 2024-06-24 RX ORDER — ONDANSETRON 2 MG/ML
INJECTION INTRAMUSCULAR; INTRAVENOUS AS NEEDED
Status: DISCONTINUED | OUTPATIENT
Start: 2024-06-24 | End: 2024-06-24 | Stop reason: SURG

## 2024-06-24 RX ORDER — OXYCODONE HYDROCHLORIDE 5 MG/1
5 TABLET ORAL
Status: DISCONTINUED | OUTPATIENT
Start: 2024-06-24 | End: 2024-06-24 | Stop reason: HOSPADM

## 2024-06-24 RX ORDER — KETOROLAC TROMETHAMINE 30 MG/ML
INJECTION, SOLUTION INTRAMUSCULAR; INTRAVENOUS AS NEEDED
Status: DISCONTINUED | OUTPATIENT
Start: 2024-06-24 | End: 2024-06-24 | Stop reason: SURG

## 2024-06-24 RX ORDER — LIDOCAINE HYDROCHLORIDE 20 MG/ML
INJECTION, SOLUTION EPIDURAL; INFILTRATION; INTRACAUDAL; PERINEURAL AS NEEDED
Status: DISCONTINUED | OUTPATIENT
Start: 2024-06-24 | End: 2024-06-24 | Stop reason: SURG

## 2024-06-24 RX ORDER — SUCCINYLCHOLINE/SOD CL,ISO/PF 100 MG/5ML
SYRINGE (ML) INTRAVENOUS AS NEEDED
Status: DISCONTINUED | OUTPATIENT
Start: 2024-06-24 | End: 2024-06-24 | Stop reason: SURG

## 2024-06-24 RX ORDER — MAGNESIUM HYDROXIDE 1200 MG/15ML
LIQUID ORAL AS NEEDED
Status: DISCONTINUED | OUTPATIENT
Start: 2024-06-24 | End: 2024-06-24 | Stop reason: HOSPADM

## 2024-06-24 RX ORDER — SODIUM CHLORIDE 0.9 % (FLUSH) 0.9 %
10 SYRINGE (ML) INJECTION EVERY 12 HOURS SCHEDULED
Status: DISCONTINUED | OUTPATIENT
Start: 2024-06-24 | End: 2024-06-24 | Stop reason: HOSPADM

## 2024-06-24 RX ORDER — MIDAZOLAM HYDROCHLORIDE 2 MG/2ML
2 INJECTION, SOLUTION INTRAMUSCULAR; INTRAVENOUS ONCE
Status: COMPLETED | OUTPATIENT
Start: 2024-06-24 | End: 2024-06-24

## 2024-06-24 RX ORDER — HYDROCODONE BITARTRATE AND ACETAMINOPHEN 5; 325 MG/1; MG/1
1 TABLET ORAL EVERY 4 HOURS PRN
Qty: 6 TABLET | Refills: 0 | Status: SHIPPED | OUTPATIENT
Start: 2024-06-24

## 2024-06-24 RX ORDER — FENTANYL CITRATE 50 UG/ML
INJECTION, SOLUTION INTRAMUSCULAR; INTRAVENOUS AS NEEDED
Status: DISCONTINUED | OUTPATIENT
Start: 2024-06-24 | End: 2024-06-24 | Stop reason: SURG

## 2024-06-24 RX ADMIN — FENTANYL CITRATE 50 MCG: 50 INJECTION, SOLUTION INTRAMUSCULAR; INTRAVENOUS at 08:19

## 2024-06-24 RX ADMIN — ROCURONIUM BROMIDE 45 MG: 10 INJECTION, SOLUTION INTRAVENOUS at 08:26

## 2024-06-24 RX ADMIN — KETOROLAC TROMETHAMINE 30 MG: 30 INJECTION, SOLUTION INTRAMUSCULAR; INTRAVENOUS at 09:23

## 2024-06-24 RX ADMIN — FENTANYL CITRATE 50 MCG: 50 INJECTION, SOLUTION INTRAMUSCULAR; INTRAVENOUS at 09:15

## 2024-06-24 RX ADMIN — LIDOCAINE HYDROCHLORIDE 100 MG: 20 INJECTION, SOLUTION EPIDURAL; INFILTRATION; INTRACAUDAL; PERINEURAL at 08:19

## 2024-06-24 RX ADMIN — ROCURONIUM BROMIDE 5 MG: 10 INJECTION, SOLUTION INTRAVENOUS at 08:19

## 2024-06-24 RX ADMIN — MIDAZOLAM HYDROCHLORIDE 2 MG: 1 INJECTION, SOLUTION INTRAMUSCULAR; INTRAVENOUS at 08:01

## 2024-06-24 RX ADMIN — ONDANSETRON 4 MG: 2 INJECTION INTRAMUSCULAR; INTRAVENOUS at 08:28

## 2024-06-24 RX ADMIN — SODIUM CHLORIDE, POTASSIUM CHLORIDE, SODIUM LACTATE AND CALCIUM CHLORIDE 9 ML/HR: 600; 310; 30; 20 INJECTION, SOLUTION INTRAVENOUS at 07:22

## 2024-06-24 RX ADMIN — DEXAMETHASONE SODIUM PHOSPHATE 4 MG: 4 INJECTION, SOLUTION INTRAMUSCULAR; INTRAVENOUS at 08:28

## 2024-06-24 RX ADMIN — DEXMEDETOMIDINE 4 MCG: 100 INJECTION, SOLUTION INTRAVENOUS at 08:43

## 2024-06-24 RX ADMIN — HYDROMORPHONE HYDROCHLORIDE 0.25 MG: 1 INJECTION, SOLUTION INTRAMUSCULAR; INTRAVENOUS; SUBCUTANEOUS at 10:00

## 2024-06-24 RX ADMIN — SODIUM CHLORIDE 2000 MG: 9 INJECTION, SOLUTION INTRAVENOUS at 08:24

## 2024-06-24 RX ADMIN — ACETAMINOPHEN 1000 MG: 500 TABLET ORAL at 07:29

## 2024-06-24 RX ADMIN — PROPOFOL 200 MG: 10 INJECTION, EMULSION INTRAVENOUS at 08:19

## 2024-06-24 RX ADMIN — Medication 100 MG: at 08:19

## 2024-06-24 RX ADMIN — DEXMEDETOMIDINE 4 MCG: 100 INJECTION, SOLUTION INTRAVENOUS at 08:53

## 2024-06-24 RX ADMIN — SUGAMMADEX 200 MG: 100 INJECTION, SOLUTION INTRAVENOUS at 09:30

## 2024-06-24 NOTE — H&P
History of Present Illness     Lui Sanchez is a 63 y.o. male presents to [facility]   History of Present Illness  The patient presents for evaluation of a cyst on his back.     Approximately 1.5 months [facility]. The cyst was approximately 0.25 inch in diameter. Approximately 3 weeks ago, the cyst ruptured and began to drain through his shirt. His son attempted to extract some of the fluid, which was yellow and blood-tinged, resembling lotion or soap. The patient sought medical evaluation at the peak of the swelling, suspecting only a cyst. It is noteworthy that the patient underwent shoulder surgery a few years ago.        Objective  Medical History        Past Medical History:   Diagnosis Date    GERD (gastroesophageal reflux disease)      Hypertension              Surgical History         Past Surgical History:   Procedure Laterality Date    APPENDECTOMY        FEMUR SURGERY         removed mass    HERNIA REPAIR         Umbilical and Hiatal    SHOULDER ARTHROSCOPY                  Current Medications      Current Outpatient Medications:     amLODIPine-benazepril (LOTREL) 10-40 MG per capsule, amlodipine-benazepril 10-40 mg oral capsule take 1 capsule by oral route once daily   Active, Disp: , Rfl:     Ascorbic Acid (Vitamin C) 500 MG chewable tablet, Chew., Disp: , Rfl:     Biotin 10 MG tablet, Take  by mouth., Disp: , Rfl:     Cyanocobalamin (Vitamin B-12) 5000 MCG lozenge, Take  by mouth., Disp: , Rfl:     HYDROCHLOROTHIAZIDE PO, Take  by mouth., Disp: , Rfl:     Loratadine (Claritin) 10 MG capsule, Take  by mouth., Disp: , Rfl:     pantoprazole (PROTONIX) 20 MG EC tablet, Take 1 tablet by mouth Daily., Disp: , Rfl:         Allergies         Allergies   Allergen Reactions    Other Swelling       PATIENT STATES HE IS NOT ALLERGIC TO ANYTHING.                  Family History   Problem Relation Age of Onset    No Known  "Problems Mother      No Known Problems Father      Cancer Paternal Grandmother      Cancer Paternal Grandfather           Social History   Social History            Socioeconomic History    Marital status:    Tobacco Use    Smoking status: Former       Current packs/day: 0.50       Types: Cigarettes       Passive exposure: Never    Smokeless tobacco: Never   Vaping Use    Vaping status: Never Used   Substance and Sexual Activity    Alcohol use: Yes       Alcohol/week: 1.0 standard drink of alcohol       Types: 1 Cans of beer per week       Comment: Occasionally    Drug use: Never    Sexual activity: Defer            Vital Signs:   /100 (BP Location: Right arm, Patient Position: Sitting, Cuff Size: Adult)   Pulse 64   Ht 180.3 cm (71\")   Wt 105 kg (232 lb)   BMI 32.36 kg/m²      Physical Exam   Physical Exam  Patient is in no acute distress, nonlabored.  Abdomen is soft.  On his back, on the upper right shoulder, there are 2 lesions, one superior to one, a little inferior and further to the right lateral, which both appear to be sebaceous cysts. They are firm. They have a punctum. They do not appear to be infected at this time. He also has multiple skin lesions which appear to be either moles or actinic keratoses.              Result Review  :           Procedures   Results                 Assessment  Assessment and Plan   Diagnoses and all orders for this visit:     1. Mass of skin of back (Primary)              Assessment & Plan  1. Multiple skin lesions on his back.  The patient expressed a desire for excision of these lesions in the operating room. The potential risks, benefits, and alternatives of the procedure were thoroughly discussed. All his queries were addressed. He expressed understanding and agreed to proceed with the proposed plan.        Follow Up   No follow-ups on file.  Patient was given instructions and counseling regarding his condition or for health maintenance advice. Please see " specific information pulled into the AVS if appropriate.      Discussed with the patient - all questions were answered they voiced understanding and agreed to proceed with above plan     Patient or patient representative verbalized consent for the use of Ambient Listening during the visit with  Gallo Morales MD for chart documentation. 5/30/2024  11:47 EDT     Gallo Morales MD

## 2024-06-24 NOTE — ANESTHESIA POSTPROCEDURE EVALUATION
Patient: Lui Sanchez    Procedure Summary       Date: 06/24/24 Room / Location: [facility] OR 02 / [facility] MAIN OR    Anesthesia Start: 0814 Anesthesia Stop: 0943    Procedure: EXCISION CYST of right shoulder, mid-back (Right: Shoulder) Diagnosis:       Mass of skin of back      (Mass of skin of back [R22.2])    Surgeons: Gallo Morales MD Provider: Spencer Carreon MD    Anesthesia Type: MAC, general ASA Status: 3            Anesthesia Type: MAC, general    Vitals  Vitals Value Taken Time   /57 06/24/24 0948   Temp 36.4 °C (97.6 °F) 06/24/24 0945   Pulse 60 06/24/24 0950   Resp 16 06/24/24 0945   SpO2 90 % 06/24/24 0950   Vitals shown include unfiled device data.        Post Anesthesia Care and Evaluation    Patient location during evaluation: bedside  Patient participation: complete - patient participated  Level of consciousness: awake and alert  Pain management: adequate    Airway patency: patent  Anesthetic complications: No anesthetic complications  PONV Status: none  Cardiovascular status: acceptable  Respiratory status: acceptable  Hydration status: acceptable    Comments: An Anesthesiologist personally participated in the most demanding procedures (including induction and emergence if applicable) in the anesthesia plan, monitored the course of anesthesia administration at frequent intervals and remained physically present and available for immediate diagnosis and treatment of emergencies.

## 2024-06-24 NOTE — DISCHARGE INSTRUCTIONS
DISCHARGE INSTRUCTIONS  SURGICAL / AMBULATORY  PROCEDURES      For your surgery you had:  General anesthesia (you may have a sore throat for the first 24 hours).   You may experience dizziness, drowsiness, or light-headedness for several hours following surgery/procedure.  Do not stay alone today or tonight.  Limit your activity for 24 hours.  Resume your diet slowly.  Follow whatever special dietary instructions you may have been given by your doctor.  You should not drive or operate machinery, drink alcohol, or sign legally binding documents for 24 hours or while you are taking pain medication.  Last dose of pain medication was given at:  Tylenol 1000 mg at 7:29 am .  NOTIFY YOUR DOCTOR IF YOU EXPERIENCE ANY OF THE FOLLOWING:  Temperature greater than 101 degrees Fahrenheit  Shaking Chills  Redness or excessive drainage from incision  Nausea, vomiting and/or pain that is not controlled by prescribed medications  Increase in bleeding or bleeding that is excessive  Unable to urinate in 6 hours after surgery  If unable to reach your doctor, please go to the closest Emergency Room      SPECIAL INSTRUCTIONS:  Follow 's verbal instructions and see AVS

## 2024-06-24 NOTE — ANESTHESIA PREPROCEDURE EVALUATION
Anesthesia Evaluation     Patient summary reviewed and Nursing notes reviewed   no history of anesthetic complications:   NPO Solid Status: > 8 hours  NPO Liquid Status: > 2 hours           Airway   Mallampati: II  TM distance: >3 FB  Neck ROM: full  No difficulty expected  Dental - normal exam     Pulmonary - normal exam    breath sounds clear to auscultation  (+) pneumonia (6/2/24) resolved ,sleep apnea (occasionally uses CPAP)  Cardiovascular - normal exam  Exercise tolerance: good (4-7 METS)    Rhythm: regular  Rate: normal    (+) hypertension (hctz, amlodipine/benazepril (took all meds this morning)), hyperlipidemia      Neuro/Psych- negative ROS  GI/Hepatic/Renal/Endo    (+) GERD (protonix) poorly controlled    Musculoskeletal (-) negative ROS    Abdominal   (+) obese   Substance History - negative use     OB/GYN negative ob/gyn ROS         Other - negative ROS       ROS/Med Hx Other: HX OF HTN, HLD, RAMONA. RECENT PNEUMONIA 6/2/24 TREATED AND PATIENT STATES RESOLVED. METS>4. NO CP/SOA. ELM                     Anesthesia Plan    ASA 3     MAC and general     (Patient understands anesthesia not responsible for dental damage.)  intravenous induction     Anesthetic plan, risks, benefits, and alternatives have been provided, discussed and informed consent has been obtained with: patient and spouse/significant other.    Plan discussed with CRNA.        CODE STATUS:

## 2024-06-24 NOTE — OP NOTE
Preoperative diagnosis: Multiple back skin lesions and right upper shoulder back mass    Postoperative diagnosis: Same    Findings: Multiple back skin lesions and right upper shoulder back mass  Lower middle back lesion-1.5 x 1.5 cm-full-thickness skin  Right lower back skin lesion-0.5 x 0.5 gt-cgebgum-ltajrxpbt skin  Left lower mid back lesion-0.5 x 0.5 ue-qxzhybx-cswyzlkol skin  Central and mid back skin lesions-taken from multiple areas of the mid back-multiple small lesions in aggregate would be 1 x 1 ek-lwhkexb-uipbhpcwq skin  Right scapular back skin lesion 0.5 x 0.5 cm-full-thickness skin  Right medial scapular skin lesion-0.5 x 0.5 gn-adxsqob-adzecgked skin  Left medial scapular skin lesion-0.5 x 0.5 oj-iepvbjy-ojncfxmpk skin  Left lower flank skin lesion-1 x 1 cm-full-thickness skin  Right shoulder cyst-2 x 2 cm-taken from the deep subcutaneous tissue    Procedure: Excision of multiple skin lesions and right upper back mass    Surgeon: Gallo Morales MD    Anesthesia: General    Assistant: Jennifer WARE CSA    EBL: 11 mL    Specimens: As noted above in findings    Complications: None    Indications: 63-year-old male with multiple back skin lesions as well as a back mass which appeared to be a sebaceous cyst in the right shoulder.  Presents today for elective excision.    PROCEDURE    Patient was seen in the preoperative holding area.  Risks, benefits, alternatives of the operation were again discussed in detail.  All of the patient and family's questions were answered.  They voiced understanding, and agreed to proceed.    Patient was brought to the operating room.  Monitoring devices and Dylan stockings were placed.  Anesthesia was administered.  Patient was prepped and draped in standard surgical fashion.  After prepping and draping a timeout was performed to verify both the correct patient and correct procedure.    We began by injecting local anesthesia around the area of all of the masses and skin  lesions.  All of the skin lesions were excised with either partial-thickness or full-thickness excision.  We used a 15 blade and scissors to excise these lesions.  Most of the lesions were closed with a subcuticular 4-0 Vicryl stitch and dressed with skin affix skin glue.  Some of the lesions were too small to be closed with a stitch and skin glue was applied.    We also excised a right shoulder mass.  Elliptical incision was made around the area of the mass and carried our dissection down with a combination of sharp dissection and electrocautery.  Once it was circumferentially dissected it was amputated.  We copiously irrigated the wound bed.  Any bleeding was addressed with electrocautery.  We closed the wound with deep interrupted 3-0 Vicryl sutures.  Skin was closed with a subcuticular 4-0 Vicryl stitch.  This wound was also dressed with skin affix skin glue.    The patient was then aroused from anesthesia, and taken off the OR table, and taken to PACU in stable condition.  Sponge, needle, and instrument counts were correct x2.  Jennifer WARE CSA, was present for the entire procedure and helped in all portions of the case.    Assistant: Jennifer Abraham CSA was responsible for performing the following activities: Retraction, Suction, Irrigation, Suturing, Closing, and Placing Dressing and their skilled assistance was necessary for the success of this case.      The operative note was dictated with the help of the dragon dictation system.

## 2024-06-25 LAB
CYTO UR: NORMAL
LAB AP CASE REPORT: NORMAL
LAB AP CLINICAL INFORMATION: NORMAL
PATH REPORT.FINAL DX SPEC: NORMAL
PATH REPORT.GROSS SPEC: NORMAL

## 2024-06-28 ENCOUNTER — TELEPHONE (OUTPATIENT)
Dept: SURGERY | Facility: CLINIC | Age: 64
End: 2024-06-28
Payer: OTHER GOVERNMENT

## 2024-06-28 NOTE — TELEPHONE ENCOUNTER
Caller: Lui Sanchez    Relationship: Self    Best call back number: [phone]    What was the call regarding: LVM WAS LEFT FOR PATIENT TO CALL IN REGARDING HIS APPT REGIN[facility]/ [facility] ON 7/10

## 2024-07-05 ENCOUNTER — TELEPHONE (OUTPATIENT)
Dept: UROLOGY | Facility: CLINIC | Age: 64
End: 2024-07-05
Payer: OTHER GOVERNMENT

## 2024-07-05 RX ORDER — SULFAMETHOXAZOLE AND TRIMETHOPRIM 800; 160 MG/1; MG/1
1 TABLET ORAL 2 TIMES DAILY
Qty: 20 TABLET | Refills: 0 | Status: SHIPPED | OUTPATIENT
Start: 2024-07-05

## 2024-07-05 NOTE — TELEPHONE ENCOUNTER
PT CALLED AND STATED HIS CYST HAS RETURNED AND STARTED LEAKING ORANGE FLUID. HE STATED HE THINKS IT IS INFECTED. PT NEEDED TO KNOW IF HE SHOULD BE SEEN SOONER OR WHAT TO DO UNTIL SCHEDULED APPT ON  7/18. [facility] [phone]

## 2024-07-05 NOTE — TELEPHONE ENCOUNTER
Per Dr. Morales, he will call in antibiotics for the patient and he can come in to see him on Thursday next week (7/11). I attempted to call the patient but he did not answer. Message was left on his machine.

## 2024-07-11 ENCOUNTER — OFFICE VISIT (OUTPATIENT)
Dept: SURGERY | Facility: CLINIC | Age: 64
End: 2024-07-11
Payer: OTHER GOVERNMENT

## 2024-07-11 VITALS
DIASTOLIC BLOOD PRESSURE: 88 MMHG | RESPIRATION RATE: 16 BRPM | HEART RATE: 81 BPM | WEIGHT: 221 LBS | SYSTOLIC BLOOD PRESSURE: 132 MMHG | HEIGHT: 71 IN | BODY MASS INDEX: 30.94 KG/M2

## 2024-07-11 DIAGNOSIS — R22.2 MASS OF SKIN OF BACK: Primary | ICD-10-CM

## 2024-07-11 RX ORDER — CHLORPROMAZINE HYDROCHLORIDE 25 MG/1
1 TABLET, FILM COATED ORAL EVERY 6 HOURS
COMMUNITY
Start: 2024-06-19

## 2024-07-11 RX ORDER — METOCLOPRAMIDE 10 MG/1
TABLET ORAL
COMMUNITY
Start: 2024-06-06

## 2024-07-11 RX ORDER — BACLOFEN 10 MG/1
TABLET ORAL
COMMUNITY
Start: 2024-06-06

## 2024-07-11 NOTE — PROGRESS NOTES
Chief Complaint: Post-op (Excision of back mass )    Subjective         History of Present Illness      Lui Sanchez is a 63 y.o. male presents to [facility] GENERAL SURGERY     History of Present Illness  The patient presents for evaluation of a wound on his right shoulder.    The patient reports that his wife applied a Band-Aid to the wound, which he believes has since closed. The bleeding has ceased through his shirts. He has been prescribed antibiotics.    Objective     Past Medical History:   Diagnosis Date    GERD (gastroesophageal reflux disease)     Hyperlipidemia     Hypertension     Pneumonia     6/2/24 REPORTS WAS TREATED AND DENIES ANY CURRENT ISSUES    Sleep apnea        Past Surgical History:   Procedure Laterality Date    APPENDECTOMY      EXCISION LESION Right 6/24/2024    Procedure: EXCISION CYST of right shoulder, mid-back;  Surgeon: Gallo Morales MD;  Location: [facility];  Service: General;  Laterality: Right;    FEMUR SURGERY Left     removed mass    HERNIA REPAIR      Umbilical and Hiatal    SHOULDER ARTHROSCOPY Right          Current Outpatient Medications:     amLODIPine-benazepril (LOTREL) 10-40 MG per capsule, amlodipine-benazepril 10-40 mg oral capsule take 1 capsule by oral route once daily   Active, Disp: , Rfl:     Ascorbic Acid (Vitamin C) 500 MG chewable tablet, Chew Daily As Needed., Disp: , Rfl:     baclofen (LIORESAL) 10 MG tablet, , Disp: , Rfl:     Biotin 10 MG tablet, Take  by mouth Daily As Needed., Disp: , Rfl:     chlorproMAZINE (THORAZINE) 25 MG tablet, Take 1 tablet by mouth Every 6 (Six) Hours., Disp: , Rfl:     Cyanocobalamin (Vitamin B-12) 5000 MCG lozenge, Take  by mouth Daily As Needed., Disp: , Rfl:     HYDROCHLOROTHIAZIDE PO, Take 25 mg by mouth Daily., Disp: , Rfl:     Loratadine (Claritin) 10 MG capsule, Take  by mouth Daily As Needed., Disp: , Rfl:     metoclopramide (REGLAN) 10 MG tablet, , Disp: , Rfl:     pantoprazole (PROTONIX) 40  MG EC tablet, Take 1 tablet by mouth Daily., Disp: , Rfl:     sulfamethoxazole-trimethoprim (Bactrim DS) 800-160 MG per tablet, Take 1 tablet by mouth 2 (Two) Times a Day., Disp: 20 tablet, Rfl: 0    atorvastatin (LIPITOR) 10 MG tablet, Take 1 tablet by mouth Daily. (Patient not taking: Reported on 2024), Disp: , Rfl:     docusate sodium (Colace) 100 MG capsule, Take 1 capsule by mouth 2 (Two) Times a Day As Needed for Constipation. (Patient not taking: Reported on 2024), Disp: 10 capsule, Rfl: 1    HYDROcodone-acetaminophen (Norco) 5-325 MG per tablet, Take 1 tablet by mouth Every 4 (Four) Hours As Needed for Moderate Pain. (Patient not taking: Reported on 2024), Disp: 6 tablet, Rfl: 0    No Known Allergies     Family History   Problem Relation Age of Onset    No Known Problems Mother     No Known Problems Father     Cancer Paternal Grandmother     Cancer Paternal Grandfather     Malig Hyperthermia Neg Hx        Social History     Socioeconomic History    Marital status:    Tobacco Use    Smoking status: Former     Current packs/day: 0.00     Types: Cigarettes     Quit date:      Years since quittin.5     Passive exposure: Never    Smokeless tobacco: Never    Tobacco comments:     Smoked for 10 years, quit 25 years ago   Vaping Use    Vaping status: Never Used   Substance and Sexual Activity    Alcohol use: Yes     Alcohol/week: 1.0 standard drink of alcohol     Types: 1 Cans of beer per week     Comment: Occasionally    Drug use: Never    Sexual activity: Defer        Physical Exam   Physical Exam  Patient is in no acute distress, has unlabored breathing, and is having some hiccups.        Result Review :            Results           Assessment and Plan    Diagnoses and all orders for this visit:    1. Mass of skin of back (Primary)          Assessment & Plan  1. Open wound from previous excision site.  The patient is advised to complete his course of antibiotics, after which local  wound care will be initiated. The wound should heal appropriately. He is advised to contact us with any questions or concerns, concerns, or if the condition worsens. All queries were addressed, and he expressed understanding and agreed to proceed with the above plan.      Follow Up   No follow-ups on file.  Patient was given instructions and counseling regarding his condition or for health maintenance advice. Please see specific information pulled into the AVS if appropriate.     Patient or patient representative verbalized consent for the use of Ambient Listening during the visit with  Gallo Morales MD for chart documentation. 7/11/2024  17:57 EDT    Gallo Morales MD

## (undated) DEVICE — GOWN,REINFRCE,POLY,SIRUS,BREATH SLV,XXLG: Brand: MEDLINE

## (undated) DEVICE — ANTIBACTERIAL UNDYED BRAIDED (POLYGLACTIN 910), SYNTHETIC ABSORBABLE SUTURE: Brand: COATED VICRYL

## (undated) DEVICE — SOL IRR NACL 0.9PCT BT 1000ML

## (undated) DEVICE — GLOVE,SURG,SENSICARE SLT,LF,PF,7.5: Brand: MEDLINE

## (undated) DEVICE — PENCL SMOKE/EVAC MEGADYNE TELESCP 10FT

## (undated) DEVICE — INTENDED FOR TISSUE SEPARATION, AND OTHER PROCEDURES THAT REQUIRE A SHARP SURGICAL BLADE TO PUNCTURE OR CUT.: Brand: BARD-PARKER ® CARBON RIB-BACK BLADES

## (undated) DEVICE — STERILE POLYISOPRENE POWDER-FREE SURGICAL GLOVES WITH EMOLLIENT COATING: Brand: PROTEXIS

## (undated) DEVICE — GLOVE,SURG,SENSICARE SLT,LF,PF,6.5: Brand: MEDLINE

## (undated) DEVICE — ADHS SKIN SURG TISS VISC PREMIERPRO EXOFIN HI/VISC FAST/DRY

## (undated) DEVICE — SUT VIC 3/0 SH 27IN J416H

## (undated) DEVICE — MAJOR-LF: Brand: MEDLINE INDUSTRIES, INC.